# Patient Record
(demographics unavailable — no encounter records)

---

## 2024-12-14 NOTE — HISTORY OF PRESENT ILLNESS
[FreeTextEntry1] : RPV- psoriasis [de-identified] : ZION DUNAWAY is a 67 year old who is presenting for evaluation of:   1. Psoriasis - been on tremfya since Nov 2022, however has been off for 2 months since he missed interim appt, previously followed with Dr. Matute. Rx was recently resent however it won't arrive until 12/18. Overall doing well however flaring at posterior scalp. Notes posterior scalp has never cleared while on biologic. It is better in the summer and worse in the winter. Uses clobetasol solution occasionally for severe itch only as it gives him pimples if he uses too much. No joint pains. Recently diagnosed with myeloma, will be starting therarpy for it possibly next. week.

## 2024-12-14 NOTE — ASSESSMENT
[FreeTextEntry1] : #Psoriasis, Chronic, improved overall but flare on posterior scalp  - doing well on Tremfya, continue treatment q8 weeks, SED. Advised patient to inform his oncologist he is on tremfya and if that is an issue when he starts treatment for his myeloma - Continue clobetasol solution for posterior scalp - Reviewed shampoos such as Tsal vs. Selsun blue, vs ketoconazole- advise he switches between a few  #High risk med - will check labs: cbc, cmp, hep b/c serology, quant  RTC 4-6 months

## 2024-12-14 NOTE — HISTORY OF PRESENT ILLNESS
[FreeTextEntry1] : RPV- psoriasis [de-identified] : ZION DUNAWAY is a 67 year old who is presenting for evaluation of:   1. Psoriasis - been on tremfya since Nov 2022, however has been off for 2 months since he missed interim appt, previously followed with Dr. Matute. Rx was recently resent however it won't arrive until 12/18. Overall doing well however flaring at posterior scalp. Notes posterior scalp has never cleared while on biologic. It is better in the summer and worse in the winter. Uses clobetasol solution occasionally for severe itch only as it gives him pimples if he uses too much. No joint pains. Recently diagnosed with myeloma, will be starting therarpy for it possibly next. week.